# Patient Record
Sex: FEMALE | Race: ASIAN | NOT HISPANIC OR LATINO
[De-identification: names, ages, dates, MRNs, and addresses within clinical notes are randomized per-mention and may not be internally consistent; named-entity substitution may affect disease eponyms.]

---

## 2022-10-17 PROBLEM — Z00.00 ENCOUNTER FOR PREVENTIVE HEALTH EXAMINATION: Status: ACTIVE | Noted: 2022-10-17

## 2022-10-19 ENCOUNTER — APPOINTMENT (OUTPATIENT)
Dept: CARDIOLOGY | Facility: CLINIC | Age: 52
End: 2022-10-19

## 2022-10-19 VITALS
OXYGEN SATURATION: 97 % | HEIGHT: 63 IN | HEART RATE: 79 BPM | WEIGHT: 120 LBS | RESPIRATION RATE: 18 BRPM | SYSTOLIC BLOOD PRESSURE: 104 MMHG | TEMPERATURE: 97.6 F | DIASTOLIC BLOOD PRESSURE: 70 MMHG | BODY MASS INDEX: 21.26 KG/M2

## 2022-10-19 DIAGNOSIS — Z90.710 ACQUIRED ABSENCE OF BOTH CERVIX AND UTERUS: ICD-10-CM

## 2022-10-19 DIAGNOSIS — R07.9 CHEST PAIN, UNSPECIFIED: ICD-10-CM

## 2022-10-19 DIAGNOSIS — R94.31 ABNORMAL ELECTROCARDIOGRAM [ECG] [EKG]: ICD-10-CM

## 2022-10-19 DIAGNOSIS — Z78.9 OTHER SPECIFIED HEALTH STATUS: ICD-10-CM

## 2022-10-19 PROCEDURE — 99204 OFFICE O/P NEW MOD 45 MIN: CPT

## 2022-10-20 ENCOUNTER — APPOINTMENT (OUTPATIENT)
Dept: CARDIOLOGY | Facility: CLINIC | Age: 52
End: 2022-10-20

## 2022-10-20 VITALS
HEART RATE: 75 BPM | RESPIRATION RATE: 18 BRPM | DIASTOLIC BLOOD PRESSURE: 77 MMHG | TEMPERATURE: 97.5 F | SYSTOLIC BLOOD PRESSURE: 117 MMHG | OXYGEN SATURATION: 98 %

## 2022-10-20 PROBLEM — Z78.9 ELECTRONIC CIGARETTE USE: Status: ACTIVE | Noted: 2022-10-20

## 2022-10-20 PROBLEM — Z90.710 S/P HYSTERECTOMY: Status: RESOLVED | Noted: 2022-10-20 | Resolved: 2022-10-20

## 2022-10-20 PROCEDURE — 93306 TTE W/DOPPLER COMPLETE: CPT

## 2022-10-20 PROCEDURE — 93015 CV STRESS TEST SUPVJ I&R: CPT

## 2022-10-20 PROCEDURE — ZZZZZ: CPT

## 2022-10-20 RX ORDER — BISMUTH SUBSALICYLATE 262MG/15ML
SUSPENSION, ORAL (FINAL DOSE FORM) ORAL
Refills: 0 | Status: ACTIVE | COMMUNITY

## 2022-10-31 PROBLEM — R07.9 CHEST PAIN: Status: ACTIVE | Noted: 2022-10-20

## 2022-10-31 PROBLEM — R94.31 ABNORMAL ECG: Status: ACTIVE | Noted: 2022-10-31

## 2022-10-31 NOTE — HISTORY OF PRESENT ILLNESS
[FreeTextEntry1] : 52 year old female w. hx of GERD, Hysterectomy, and R. knee sx after fall presents for evaluation of abnormal ECG and CP. Pt reports, for 1/2 yr, she has been feeling epigastric fullness w. acid reflux. She tried Pepto-Bismol and Omeprazole, but with little improvement. Pt reports episodes of fast heart beats. Pt denies SOB. Pt denies h/o syncope. I advised patient to undergo an echocardiogram and a treadmill stress test. \par